# Patient Record
Sex: MALE | Race: WHITE | NOT HISPANIC OR LATINO | Employment: OTHER | URBAN - METROPOLITAN AREA
[De-identification: names, ages, dates, MRNs, and addresses within clinical notes are randomized per-mention and may not be internally consistent; named-entity substitution may affect disease eponyms.]

---

## 2019-02-13 ENCOUNTER — HOSPITAL ENCOUNTER (OUTPATIENT)
Dept: PHYSICAL THERAPY | Facility: CLINIC | Age: 54
Setting detail: THERAPIES SERIES
End: 2019-02-13
Attending: ORTHOPAEDIC SURGERY
Payer: COMMERCIAL

## 2019-02-13 PROCEDURE — 97161 PT EVAL LOW COMPLEX 20 MIN: CPT | Mod: GP | Performed by: PHYSICAL THERAPIST

## 2019-02-13 PROCEDURE — 97110 THERAPEUTIC EXERCISES: CPT | Mod: GP | Performed by: PHYSICAL THERAPIST

## 2019-02-13 NOTE — PROGRESS NOTES
02/13/19 0800   General Information   Type of Visit Initial OP Ortho PT Evaluation   Start of Care Date 02/13/19   Referring Physician Partha Riddle MD   Patient/Family Goals Statement 3 kids, eventually return to downhill skiing; be able to work without difficulty   Orders Evaluate and Treat   Orders Comment Having BTB ACL reconstruction and medial meniscectomy surgery 2/22/19   Date of Order 01/28/19   Insurance Type Other   Insurance Comments/Visits Authorized [employer] [id number]/365   Surgical/Medical history reviewed Yes   Precautions/Limitations no known precautions/limitations   Body Part(s)   Body Part(s) Knee   Presentation and Etiology   Pertinent history of current problem (include personal factors and/or comorbidities that impact the POC) Patient reports: right ACL and meniscus tears have caused pain and instability in the knee.  Planning to have surgery on 2/22/19.  Right know the knee is not swollen and overall pain free, however it has a history of subluxing with subsequent pain and swelling.   Impairments A. Pain;B. Decreased WB tolerance;C. Swelling;D. Decreased ROM;E. Decreased flexibility;F. Decreased strength and endurance   Functional Limitations perform activities of daily living;perform required work activities;perform desired leisure / sports activities   Symptom Location Right anterior knee; joint line   How/Where did it occur From Degenerative Joint Disease;From insidious onset;With repetition/overuse   Onset date of current episode/exacerbation 08/13/18   Chronicity Chronic   Pain rating (0-10 point scale) Best (/10);Worst (/10)   Best (/10) 0   Worst (/10) 6   Pain quality D. Burning   Frequency of pain/symptoms C. With activity   Pain/symptoms are: The same all the time   Pain/symptoms exacerbated by G. Certain positions   Pain/symptoms eased by A. Sitting   Progression of symptoms since onset: Worsened   Current / Previous Interventions   Diagnostic Tests: MRI   MRI Results Results   MRI  results 1/9/19 MR right knee: CONCLUSION:  1.  Chronic tearing and deficiency of the ACL.  2.  Tearing of the posterior horn medial meniscus with a distracted radial   component to the tear.    3.  Degeneration and likely mild degenerative tearing of the anterior horn   lateral meniscus.    4.  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   5.  Grade II chondromalacia medial femoral condyle.   6.  Moderate knee joint effusion and likely mild synovitis.     Prior Level of Function   Prior Level of Function-Mobility Independent   Prior Level of Function-ADLs Independent   Current Level of Function   Current Community Support Family/friend caregiver   Patient role/employment history A. 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 No   Have you fallen and had an injury in the past year? No   Is patient a fall risk? No   Functional Scales   Functional Scales Other   Other Scales  LEFS   Knee Objective Findings   Side (if bilateral, select both right and left) Right   Integumentary  No edema noted   Gait/Locomotion Mild julianna genu varus; mild julianna Trendelenburg   Lachmans Test (+) right   Anterior Drawer Test (+) right   Tenisha's Test (-)   Right Knee Extension AROM Right=5 deg; left=8 deg   Right Knee Flexion AROM Right=130 deg; left=140 deg   Right Hip Abduction Strength right=3+/5; left=4/5   Planned Therapy Interventions   Planned Therapy Interventions ADL retraining;balance training;gait training;joint mobilization;manual therapy;motor coordination training;neuromuscular re-education;ROM;strengthening;stretching   Planned Modality Interventions   Planned Modality Interventions Cryotherapy;Electrical stimulation   Clinical Impression   Criteria for Skilled Therapeutic Interventions Met yes, treatment indicated   PT Diagnosis Pre surgery: right  ACL bone-tendon-bone reconstruction and medial meniscectomy    Influenced by the following impairments Pain; weakness; impaired ROM; impaired gait and proprioception   Functional limitations due to impairments Difficulty walking, climbing stairs, squatting   Clinical Presentation Stable/Uncomplicated   Clinical Presentation Rationale (+) motivation; low pain, good strength going into surgery   (-) chronicity   Clinical Decision Making (Complexity) Low complexity   Therapy Frequency 2 times/Week  (s/p surgery)   Predicted Duration of Therapy Intervention (days/wks) 8-12 weeks   Risk & Benefits of therapy have been explained Yes   Patient, Family & other staff in agreement with plan of care Yes   Clinical Impression Comments Brody arrives today pre surgery with diagnosed right ACL tear and posterior horn medial meniscus tear; anterior horn lateral meniscus degeneration.    He is planning to undergo surgery and will benefit from PT pre and post surgery.   Education Assessment   Preferred Learning Style Listening;Demonstration;Pictures/video   Barriers to Learning No barriers   ORTHO GOALS   PT Ortho Eval Goals 1;2;3   Ortho Goal 1   Goal Description Patient will have >=0-125 degrees of right knee ROM to allow for normalized ADL's.   Target Date 04/10/19   Ortho Goal 2   Goal Description Patient will be able to climb 12 stairs reciprocal pattern to allow for normalized mobility.   Target Date 05/08/19   Ortho Goal 3   Goal Description Patient will be able to stand for >=1 hour to allow for return to work.   Target Date 03/09/19   Total Evaluation Time   PT Eval, Low Complexity Minutes (06656) 15         Jacqueline Medina, PT, DPT  Doctor of Physical Therapy #[id number]  [facility]  [phone]  [email or web address]

## 2019-02-25 ENCOUNTER — HOSPITAL ENCOUNTER (OUTPATIENT)
Dept: PHYSICAL THERAPY | Facility: CLINIC | Age: 54
Setting detail: THERAPIES SERIES
End: 2019-02-25
Attending: ORTHOPAEDIC SURGERY
Payer: COMMERCIAL

## 2019-02-25 PROCEDURE — 97535 SELF CARE MNGMENT TRAINING: CPT | Mod: GP | Performed by: PHYSICAL THERAPIST

## 2019-02-25 PROCEDURE — 97110 THERAPEUTIC EXERCISES: CPT | Mod: GP | Performed by: PHYSICAL THERAPIST

## 2019-03-04 ENCOUNTER — HOSPITAL ENCOUNTER (OUTPATIENT)
Dept: PHYSICAL THERAPY | Facility: CLINIC | Age: 54
Setting detail: THERAPIES SERIES
End: 2019-03-04
Attending: ORTHOPAEDIC SURGERY
Payer: COMMERCIAL

## 2019-03-04 PROCEDURE — 97110 THERAPEUTIC EXERCISES: CPT | Mod: GP | Performed by: PHYSICAL THERAPIST

## 2019-03-04 PROCEDURE — 97116 GAIT TRAINING THERAPY: CPT | Mod: GP | Performed by: PHYSICAL THERAPIST

## 2019-03-12 ENCOUNTER — HOSPITAL ENCOUNTER (OUTPATIENT)
Dept: PHYSICAL THERAPY | Facility: CLINIC | Age: 54
Setting detail: THERAPIES SERIES
End: 2019-03-12
Attending: ORTHOPAEDIC SURGERY
Payer: COMMERCIAL

## 2019-03-12 PROCEDURE — 97140 MANUAL THERAPY 1/> REGIONS: CPT | Mod: GP | Performed by: PHYSICAL THERAPIST

## 2019-03-12 PROCEDURE — 97110 THERAPEUTIC EXERCISES: CPT | Mod: GP | Performed by: PHYSICAL THERAPIST

## 2019-03-19 ENCOUNTER — HOSPITAL ENCOUNTER (OUTPATIENT)
Dept: PHYSICAL THERAPY | Facility: CLINIC | Age: 54
Setting detail: THERAPIES SERIES
End: 2019-03-19
Attending: ORTHOPAEDIC SURGERY
Payer: COMMERCIAL

## 2019-03-19 PROCEDURE — 97110 THERAPEUTIC EXERCISES: CPT | Mod: GP | Performed by: PHYSICAL THERAPIST

## 2019-04-09 ENCOUNTER — HOSPITAL ENCOUNTER (OUTPATIENT)
Dept: PHYSICAL THERAPY | Facility: CLINIC | Age: 54
Setting detail: THERAPIES SERIES
End: 2019-04-09
Attending: ORTHOPAEDIC SURGERY
Payer: COMMERCIAL

## 2019-04-09 PROCEDURE — 97110 THERAPEUTIC EXERCISES: CPT | Mod: GP | Performed by: PHYSICAL THERAPIST

## 2019-06-10 NOTE — PROGRESS NOTES
Outpatient Physical Therapy Discharge Note     Patient: Chase Grady  : 1965    Beginning/End Dates of Reporting Period:  19 to 6/10/2019    Referring Provider: Parhta Riddle MD    Therapy Diagnosis: s/p right BTB patellar tendon ACL reconstroction and medial meniscectomy     Client Self Report: No longer needing the brace or walker.  Only feels discomfort with open chain knee extension (walking_, feels like the front of the knee is binding.    Objective Measurements:  Objective Measure: Right knee ROM  Details: 0-120 deg    Goals:  Goal Identifier     Goal Description Patient will have >=0-125 degrees of right knee ROM to allow for normalized ADL's.   Target Date 04/10/19   Date Met      Progress:     Goal Identifier     Goal Description Patient will be able to climb 12 stairs reciprocal pattern to allow for normalized mobility.   Target Date 19   Date Met      Progress:     Goal Identifier     Goal Description Patient will be able to stand for >=1 hour to allow for return to work.   Target Date 19   Date Met  19   Progress:     Progress Toward Goals:   Progress this reporting period: Brody made good progress gaining knee ROM, quad strength, and returning to normalized gait without an assistive device / brace.  Unable to further assess progress as pt failed to schedule further appointments.      Plan:  Discharge from therapy.    Discharge:    Reason for Discharge: Patient has failed to schedule further appointments.    Equipment Issued: Theraband    Discharge Plan: Patient to continue home program.        Jacqueline Medina, PT, DPT  Doctor of Physical Therapy #[id number]  [facility]  [phone]  [email or web address]

## 2019-06-10 NOTE — ADDENDUM NOTE
Encounter addended by: Jacqueline Medina, PT on: 6/10/2019 11:41 AM   Actions taken: Sign clinical note, Episode resolved

## 2024-02-16 ENCOUNTER — HOSPITAL ENCOUNTER (EMERGENCY)
Facility: CLINIC | Age: 59
Discharge: HOME | End: 2024-02-16
Attending: PHYSICIAN ASSISTANT | Admitting: PHYSICIAN ASSISTANT
Payer: COMMERCIAL

## 2024-02-16 VITALS
HEART RATE: 81 BPM | RESPIRATION RATE: 22 BRPM | SYSTOLIC BLOOD PRESSURE: 159 MMHG | OXYGEN SATURATION: 97 % | TEMPERATURE: 97.9 F | DIASTOLIC BLOOD PRESSURE: 107 MMHG

## 2024-02-16 DIAGNOSIS — R05.1 ACUTE COUGH: ICD-10-CM

## 2024-02-16 DIAGNOSIS — J01.90 ACUTE SINUSITIS WITH SYMPTOMS > 10 DAYS: ICD-10-CM

## 2024-02-16 PROCEDURE — G0463 HOSPITAL OUTPT CLINIC VISIT: HCPCS | Performed by: PHYSICIAN ASSISTANT

## 2024-02-16 PROCEDURE — 99203 OFFICE O/P NEW LOW 30 MIN: CPT | Performed by: PHYSICIAN ASSISTANT

## 2024-02-16 ASSESSMENT — ENCOUNTER SYMPTOMS
SINUS PAIN: 1
MUSCULOSKELETAL NEGATIVE: 1
SHORTNESS OF BREATH: 0
FATIGUE: 0
HEADACHES: 1
COUGH: 1
FEVER: 0
RHINORRHEA: 1
GASTROINTESTINAL NEGATIVE: 1
SORE THROAT: 0

## 2024-02-16 NOTE — DISCHARGE INSTRUCTIONS
Use Medication as directed  Discussed chest x-ray today, but patient would like to treat sinus infection and then return in 5-6 days if cough is still persisting.     Patient informed to rest, warm compresses over sinuses as needed, stay hydrated, cool humidifier, salt water gargles, and nasal saline sprays as needed.  Lots of rest and fluids.  Tylenol as needed.    Return if any worsening symptoms or if not improving.  Follow up with primary care provider in 1 weeks.    Go to Emergency Room if sx worsen or change, worst headache of life, visual changes, confusion, fevers > 104F occur.     Patient voiced understanding of instructions given.

## 2024-02-16 NOTE — ED PROVIDER NOTES
History     Chief Complaint   Patient presents with    Cough     Patient states he had viral symptoms beginning about a month ago, tested negative for covid at home. Still experiencing cough with sinus pressure and post nasal drip.   Patient denies fever     HPI  Chase Grady is a 59 year old male who presents today with 1 month of congestion, sinus pain and pressure, cough, post nasal drainage, and sinus headache with positional changes. He denies fevers, chills, shortness of breath, chest pain, abdominal pain, nausea/vomiting.     Allergies:  No Known Allergies    Problem List:    There are no problems to display for this patient.       Past Medical History:    No past medical history on file.    Past Surgical History:    Past Surgical History:   Procedure Laterality Date    COLONOSCOPY      COLONOSCOPY N/A 12/7/2015    Procedure: COLONOSCOPY;  Surgeon: Alexis Chavez MD;  Location: [facility]       Family History:    No family history on file.    Social History:  Marital Status:   [2]  Social History     Tobacco Use    Smoking status: Never    Smokeless tobacco: Current     Types: Chew   Substance Use Topics    Alcohol use: Yes     Comment: 3 times a week    Drug use: No        Medications:    amoxicillin-clavulanate (AUGMENTIN) 875-125 MG tablet  ASPIRIN PO  LOSARTAN POTASSIUM PO  VICODIN 5-500 MG OR TABS          Review of Systems   Constitutional:  Negative for fatigue and fever.   HENT:  Positive for congestion, postnasal drip, rhinorrhea and sinus pain. Negative for sore throat.    Respiratory:  Positive for cough. Negative for shortness of breath.    Cardiovascular:  Negative for chest pain.   Gastrointestinal: Negative.    Musculoskeletal: Negative.    Neurological:  Positive for headaches (with positional changes).   All other systems reviewed and are negative.      Physical Exam   BP: (!) 159/107  Pulse: 81  Temp: 97.9  F (36.6  C)  Resp: 22  SpO2: 97 %      Physical Exam  Vitals and nursing  note reviewed.   Constitutional:       General: He is not in acute distress.     Appearance: Normal appearance. He is normal weight. He is not ill-appearing or toxic-appearing.   HENT:      Right Ear: Ear canal normal.      Left Ear: Tympanic membrane and ear canal normal.      Ears:      Comments: Right ear effusion     Nose: Congestion and rhinorrhea present.      Comments: Positive for maxillary sinus tenderness with palpation.     Mouth/Throat:      Mouth: Mucous membranes are moist.      Comments: Thick postnasal drainage  Eyes:      General: No scleral icterus.     Extraocular Movements: Extraocular movements intact.      Conjunctiva/sclera: Conjunctivae normal.      Pupils: Pupils are equal, round, and reactive to light.   Cardiovascular:      Rate and Rhythm: Normal rate and regular rhythm.      Heart sounds: Normal heart sounds.   Pulmonary:      Effort: Pulmonary effort is normal.      Breath sounds: Normal breath sounds.   Musculoskeletal:      Cervical back: Normal range of motion and neck supple. No rigidity or tenderness.   Lymphadenopathy:      Cervical: No cervical adenopathy.   Skin:     General: Skin is warm.      Capillary Refill: Capillary refill takes less than 2 seconds.   Neurological:      General: No focal deficit present.      Mental Status: He is alert and oriented to person, place, and time.   Psychiatric:         Mood and Affect: Mood normal.         Behavior: Behavior normal.         Thought Content: Thought content normal.         Judgment: Judgment normal.         ED Course                 Procedures             Critical Care time:  none               No results found for this or any previous visit (from the past 24 hour(s)).    Medications - No data to display    Assessments & Plan (with Medical Decision Making)     I have reviewed the nursing notes.    I have reviewed the findings, diagnosis, plan and need for follow up with the patient.   Chase Grady is a 59 year old male who  presents today with 1 month of congestion, sinus pain and pressure, cough, post nasal drainage, and sinus headache with positional changes. He denies fevers, chills, shortness of breath, chest pain, abdominal pain, nausea/vomiting.     Vitals within normal limits other than elevated blood pressure.  Discussed and offered chest x-ray today to rule out pneumonia or other causes of persistent cough.  Patient states he like to treat the sinus infection first and then will return in 1 to 2 weeks if cough persist.  I feel that this is reasonable since his lungs were clear on auscultation bilaterally.  Prescription for Augmentin sent to the pharmacy.  Symptomatic treatments discussed with regards to symptomatic treatment of sinusitis and patient discharged in stable condition      Discharge Medication List as of 2/16/2024  1:01 PM        START taking these medications    Details   amoxicillin-clavulanate (AUGMENTIN) 875-125 MG tablet Take 1 tablet by mouth 2 times daily for 10 days, Disp-20 tablet, R-0, E-Prescribe             Final diagnoses:   Acute sinusitis with symptoms > 10 days   Acute cough       2/16/2024   [facility] EMERGENCY DEPT       Umm Spear PA-C  02/16/24 [id number]